# Patient Record
(demographics unavailable — no encounter records)

---

## 2025-05-02 NOTE — PHYSICAL EXAM
[Left] : left knee [NL (0)] : extension 0 degrees [5___] : hamstring 5[unfilled]/5 [Equivocal] : equivocal Sherwin [] : no lateral joint line tenderness [TWNoteComboBox7] : flexion 120 degrees

## 2025-05-02 NOTE — ASSESSMENT
[FreeTextEntry1] : Reviewed xrays and treatment options. Discussed underlying OA, possible mensicus injury. Patient declines MRI. L knee CSI tolerated well today. Ice, elevate. Start Diclofenac 75mg BID prn. Follow up with Dr. Lai.   Procedure note: Large Joint Injection was performed because of pain and inflammation, failure of conservative treatment.   Medications: Depo-Medrol: 1 cc, 80 mg. Lidocaine: 2 cc, 1%.  Marcaine: 2 cc, .25%.   Medication was injected in the left knee joint. Patient has tried OTC's including aspirin, Ibuprofen, Aleve etc or prescription NSAIDs, and/or exercises at home and/ or physical therapy without satisfactory response. The risks, benefits, and alternatives to cortisone injection were explained in full to the patient. Risks outlined include but are not limited to infection, sepsis, bleeding, scarring, skin discoloration, temporary increase in pain, syncopal episode, failure to resolve symptoms, allergic reaction, symptom recurrence, and elevation of blood sugar in diabetics. Patient understood the risks. All questions were answered. After discussion of options, patient requested an injection. Oral informed consent was obtained and sterile prep of the injection site was performed using alcohol. Sterile technique was utilized for the procedure including the preparation of the solutions used for the injection. Ethyl chloride spray was used topically.  Sterile technique used. Patient tolerated procedure well. Post Procedure Instructions: Patient was advised to call if redness, pain, or fever occur and apply ice for 15 min. out of every hour for the next 12-24 hours as tolerated. patient was advised to rest the joint(s) for 2 days.

## 2025-05-02 NOTE — HISTORY OF PRESENT ILLNESS
[9] : 9 [de-identified] : 5/2/25: 59 yo male with left knee pain since 4/24/25. He reports coming down off a ladder and twisting his knee. He had prior knee pain occasionally but no formal treatment. He has pain medially.  [] : no [FreeTextEntry1] : L knee  [FreeTextEntry5] : No known injury/fall. Experiencing sharp/shooting pain while WB for 1 week. Wrapping with ace bandage and taking Aleve with no relief.

## 2025-05-08 NOTE — ASSESSMENT
[FreeTextEntry1] : 59 y/o M with L knee OA   Had CSI one week prior  Can repeat CSI in August if needed 3 months fu

## 2025-05-08 NOTE — IMAGING
[de-identified] : LEFT KNEE EXAM Alignment: Neutral  Effusion: None Atrophy: None                                                  Stable to Varus/valgus stress Posterior Drawer Test: negative Anterior Drawer Test: Negative Knee Extension/Flexion: 0 / 120  Medial/lateral compartments Medial joint line: POS Tenderness Lateral joint line: No Tenderness Rick test: negative  Patellofemoral joint Medial patellar facet: no tenderness Patellar grind: Negative  Tendons: Pes Anserine: No tenderness Gerdys Tubercle/ IT Band: No tenderness Quadriceps Tendon: No Tenderness patellar tendon: no Tenderness Tibial tubercle: not tenderness Calf: no Tenderness  Neurovascular exam Muscle strength: 5/5 Sensation to light touch: intact Distal pulses: 2+  [id number] Xrays of the Left Knee were taken demonstrating tricompartmental arthritis with joint space collapse, osteophytes, and sclerosis

## 2025-05-08 NOTE — HISTORY OF PRESENT ILLNESS
[Frequent] : frequent [Leisure] : leisure [Sleep] : sleep [Rest] : rest [Meds] : meds [Injection therapy] : injection therapy [Standing] : standing [Walking] : walking [Stairs] : stairs [de-identified] : 05/05/25: Patient is 60 years old and is here to be evaluated and treated for the LEFT KNEE. Patient has been experiencing pain in the knee for years. Patient was seen at an [facility] where they were diagnosed with arthritis in the left knee, given an CSI injection, then referred to Dr. Lai. [] : no [FreeTextEntry1] : Left Knee [FreeTextEntry9] : prescribed meds [de-identified] : MARICRUZ Moraes